# Patient Record
(demographics unavailable — no encounter records)

---

## 2025-02-21 NOTE — END OF VISIT
[TextEntry] : IMarita, am scribing for and the presence of Dr. Trini Marquez the following sections HISTORY OF PRESENT ILLNESS, PAST MEDICAL/FAMILY/SOCIAL HISTORY; REVIEW OF SYSTEMS; PHYSICAL EXAM; DISPOSITION.

## 2025-04-17 NOTE — PHYSICAL EXAM
[MA] : MA [Well developed] : well developed [Well Nourished] : ~L well nourished [Good Hygeine] : demonstrates good hygeine [Normal Mood/Affect] : mood and affect are normal [Warm and Dry] : was warm and dry to touch [Normal Appearance] : general appearance was normal [Normal] : normal [Post Void Residual ____ml] : post void residual was [unfilled] ml [FreeTextEntry2] : Cherie [Anxiety] : patient is not anxious [Tenderness] : ~T no ~M abdominal tenderness observed [Distended] : not distended [FreeTextEntry3] : Positive hypermobility, positive empty cough stress test

## 2025-04-17 NOTE — DISCUSSION/SUMMARY
[FreeTextEntry1] : Patient presents today with mixed urinary incontinence with predominant stress incontinence with a positive empty stress test.  Treatment options for the stress incontinence were discussed and included doing nothing, behavioral modification, Kegel's exercises with and without PT, medications, a pessary or intravaginal devices, periurethral bulking, and surgical correction with a suburethral sling v Downs v autologous sling.  She is interested in surgical correction for distressing urinary incontinence and we discussed returning for urodynamic testing to further evaluate her urinary complaints as she also has some episodes of urge incontinence.  [facility] patient information on stress incontinence and overactive bladder was given to her.  All questions were answered.

## 2025-04-17 NOTE — HISTORY OF PRESENT ILLNESS
[Rectal Prolapse] : no [Urinary Frequency] : no [Feelings Of Urinary Urgency] : no [Urinary Tract Infection] : no [Urinary Frequency More Than Twice At Night (Nocturia)] : no nocturia [] : yes [Uses ___ pads per day] : uses [unfilled] pad(s) per day [de-identified] : daily laugh, cough, sneeze, walking down stairs [de-identified] : 4 [de-identified] : rare [de-identified] : + sexulally active [FreeTextEntry1] : has been dx'd with 4 UTIs -  went to urologist had renal sono, was given post-coital antbx.  pt stopped breast feeding in december last baby margarita June 2023 Patient has done physical therapy which helped with some of her urinary urgency and has also tried vaginal weights.

## 2025-04-17 NOTE — HISTORY OF PRESENT ILLNESS
[Rectal Prolapse] : no [Urinary Frequency] : no [Feelings Of Urinary Urgency] : no [Urinary Tract Infection] : no [Urinary Frequency More Than Twice At Night (Nocturia)] : no nocturia [] : yes [Uses ___ pads per day] : uses [unfilled] pad(s) per day [de-identified] : daily laugh, cough, sneeze, walking down stairs [de-identified] : 4 [de-identified] : rare [de-identified] : + sexulally active [FreeTextEntry1] : has been dx'd with 4 UTIs -  went to urologist had renal sono, was given post-coital antbx.  pt stopped breast feeding in december last baby margarita June 2023 Patient has done physical therapy which helped with some of her urinary urgency and has also tried vaginal weights.